# Patient Record
Sex: FEMALE | Race: AMERICAN INDIAN OR ALASKA NATIVE
[De-identification: names, ages, dates, MRNs, and addresses within clinical notes are randomized per-mention and may not be internally consistent; named-entity substitution may affect disease eponyms.]

---

## 2019-06-25 ENCOUNTER — HOSPITAL ENCOUNTER (EMERGENCY)
Dept: HOSPITAL 5 - ED | Age: 39
Discharge: LEFT BEFORE DISCHARGE | End: 2019-06-25
Payer: COMMERCIAL

## 2019-06-25 VITALS — SYSTOLIC BLOOD PRESSURE: 190 MMHG | DIASTOLIC BLOOD PRESSURE: 111 MMHG

## 2019-06-25 DIAGNOSIS — I10: ICD-10-CM

## 2019-06-25 DIAGNOSIS — E11.9: ICD-10-CM

## 2019-06-25 DIAGNOSIS — F17.200: ICD-10-CM

## 2019-06-25 DIAGNOSIS — I16.0: Primary | ICD-10-CM

## 2019-06-25 LAB
BASOPHILS # (AUTO): 0.2 K/MM3 (ref 0–0.1)
BASOPHILS NFR BLD AUTO: 1.2 % (ref 0–1.8)
BUN SERPL-MCNC: 7 MG/DL (ref 7–17)
BUN/CREAT SERPL: 14 %
CALCIUM SERPL-MCNC: 11.2 MG/DL (ref 8.4–10.2)
EOSINOPHIL # BLD AUTO: 0.5 K/MM3 (ref 0–0.4)
EOSINOPHIL NFR BLD AUTO: 4.3 % (ref 0–4.3)
HCT VFR BLD CALC: 40 % (ref 30.3–42.9)
HEMOLYSIS INDEX: 25
HGB BLD-MCNC: 12.6 GM/DL (ref 10.1–14.3)
LYMPHOCYTES # BLD AUTO: 4.1 K/MM3 (ref 1.2–5.4)
LYMPHOCYTES NFR BLD AUTO: 33.4 % (ref 13.4–35)
MCHC RBC AUTO-ENTMCNC: 32 % (ref 30–34)
MCV RBC AUTO: 74 FL (ref 79–97)
MONOCYTES # (AUTO): 0.6 K/MM3 (ref 0–0.8)
MONOCYTES % (AUTO): 5.2 % (ref 0–7.3)
PLATELET # BLD: 168 K/MM3 (ref 140–440)
RBC # BLD AUTO: 5.43 M/MM3 (ref 3.65–5.03)

## 2019-06-25 PROCEDURE — 36415 COLL VENOUS BLD VENIPUNCTURE: CPT

## 2019-06-25 PROCEDURE — 84484 ASSAY OF TROPONIN QUANT: CPT

## 2019-06-25 PROCEDURE — 85025 COMPLETE CBC W/AUTO DIFF WBC: CPT

## 2019-06-25 PROCEDURE — 80048 BASIC METABOLIC PNL TOTAL CA: CPT

## 2019-06-25 PROCEDURE — 71045 X-RAY EXAM CHEST 1 VIEW: CPT

## 2019-06-25 PROCEDURE — 93010 ELECTROCARDIOGRAM REPORT: CPT

## 2019-06-25 PROCEDURE — 93005 ELECTROCARDIOGRAM TRACING: CPT

## 2019-06-25 NOTE — EMERGENCY DEPARTMENT REPORT
ED Chest Pain HPI





- General


Chief Complaint: Chest Pain


Stated Complaint: CHEST PAIN


Time Seen by Provider: 06/25/19 06:11


Source: patient


Mode of arrival: Ambulatory


Limitations: No Limitations





- History of Present Illness


Initial Comments: 





39-year-old female presents to ED with complaint of chest pain 2 days.  Patient

characterizes pain as burning and tightness in the center of her chest.  Pain is

nonradiating.  Patient reports some nausea associated, no vomiting.  Denies 

shortness of breath, diaphoresis.  Patient states she initially thought the pain

was due to acid reflux.  States she took some antacids without relief.  The pain

continued into the next day.  Reports pain is intermittent, last approximately 5

minutes at a time.  Denies leg pain or swelling.  No aggravating or alleviating 

factors.  Patient is severely hypertensive.  States has been out of her blood 

pressure medications 2 weeks (clonidine, lisinopril, HCTZ).  Currently has no 

PCP.  Patient states she was scheduled to have a stress test previously, however

states she lost her insurance, so it was never done.  Patient reports tobacco 

use. Denies chest pain at this time


MD Complaint: chest pain


-: days(s) (2)


Onset: during rest, after eating


Pain Location: substernal


Severity: moderate


Severity scale (0 -10): 5


Quality: tightness, other (burning)


Consistency: intermittent


Improves With: nothing


Worsens With: nothing


re: nausea.  denies: vomting, diaphoresis, dyspnea


Other Symptoms: denies: cough, fever, leg swelling


Treatments Prior to Arrival: other (antacid)





- Related Data


                                Home Medications











 Medication  Instructions  Recorded  Confirmed  Last Taken


 


Clonidine HCl mg PO DAILY PRN 03/13/16  Unknown


 


Lisinopril/Hydrochlorothiazide 1 tab PO BID 03/13/16 03/13/16 Unknown








                                  Previous Rx's











 Medication  Instructions  Recorded  Last Taken  Type


 


Lisinopril/Hydrochlorothiazide 1 tab PO QDAY #30 tab 06/25/19 Unknown Rx





[Zestoretic 20-12.5 mg]    


 


cloNIDine [Catapres] 0.2 mg PO QHS #30 tablet 06/25/19 Unknown Rx











                                    Allergies











Allergy/AdvReac Type Severity Reaction Status Date / Time


 


No Known Allergies Allergy   Verified 06/25/19 02:52














Heart Score





- HEART Score


History: Slightly suspicious


EKG: Non-specific


Age: < 45


Risk factors: > 3 risk factors or hx of atherosclerotic disease


Troponin: < normal limit


HEART Score: 3





ED Review of Systems


ROS: 


Stated complaint: CHEST PAIN


Other details as noted in HPI





Comment: All other systems reviewed and negative


Constitutional: denies: chills, fever


Respiratory: denies: shortness of breath


Cardiovascular: chest pain


Gastrointestinal: nausea.  denies: vomiting


Musculoskeletal: other (denies leg pain and swelling)





ED Past Medical Hx





- Past Medical History


Previous Medical History?: Yes


Hx Hypertension: Yes


Hx Heart Attack/AMI:  (Angina)


Hx Diabetes: Yes





- Surgical History


Past Surgical History?: No





- Social History


Smoking Status: Current Every Day Smoker


Substance Use Type: None





- Medications


Home Medications: 


                                Home Medications











 Medication  Instructions  Recorded  Confirmed  Last Taken  Type


 


Clonidine HCl mg PO DAILY PRN 03/13/16  Unknown History


 


Lisinopril/Hydrochlorothiazide 1 tab PO BID 03/13/16 03/13/16 Unknown History


 


Lisinopril/Hydrochlorothiazide 1 tab PO QDAY #30 tab 06/25/19  Unknown Rx





[Zestoretic 20-12.5 mg]     


 


cloNIDine [Catapres] 0.2 mg PO QHS #30 tablet 06/25/19  Unknown Rx














ED Physical Exam





- General


Limitations: No Limitations


General appearance: alert, in no apparent distress, obese





- Head


Head exam: Present: atraumatic, normocephalic





- Eye


Eye exam: Present: normal appearance





- ENT


ENT exam: Present: mucous membranes moist





- Neck


Neck exam: Present: normal inspection





- Respiratory


Respiratory exam: Present: normal lung sounds bilaterally.  Absent: respiratory 

distress





- Cardiovascular


Cardiovascular Exam: Present: regular rate, normal rhythm





- GI/Abdominal


GI/Abdominal exam: Present: soft.  Absent: distended





- Extremities Exam


Extremities exam: Absent: pedal edema, calf tenderness





- Neurological Exam


Neurological exam: Present: alert, oriented X3





- Psychiatric


Psychiatric exam: Present: normal affect, normal mood





- Skin


Skin exam: Present: warm, dry, intact, normal color.  Absent: rash





ED Course


                                   Vital Signs











  06/25/19 06/25/19 06/25/19





  02:52 04:34 04:45


 


Temperature 98.5 F  


 


Pulse Rate 89  77


 


Respiratory 18  16





Rate   


 


Blood Pressure 205/118 167/86 168/91


 


O2 Sat by Pulse 99  96





Oximetry   














  06/25/19 06/25/19 06/25/19





  05:00 05:04 05:15


 


Temperature   


 


Pulse Rate 78  78


 


Respiratory 15 18 14





Rate   


 


Blood Pressure 170/89  177/84


 


O2 Sat by Pulse 97  99





Oximetry   














  06/25/19 06/25/19 06/25/19





  05:30 05:45 06:00


 


Temperature   


 


Pulse Rate 83 80 84


 


Respiratory 17 16 15





Rate   


 


Blood Pressure 181/100 175/93 196/97


 


O2 Sat by Pulse 97 96 98





Oximetry   














  06/25/19 06/25/19





  06:15 06:30


 


Temperature  


 


Pulse Rate 94 H 85


 


Respiratory 17 16





Rate  


 


Blood Pressure 189/123 190/111


 


O2 Sat by Pulse  98





Oximetry  














ED Medical Decision Making





- Lab Data


Result diagrams: 


                                 06/25/19 03:18





                                 06/25/19 03:18





- EKG Data


-: EKG Interpreted by Me


EKG shows normal: sinus rhythm, axis, intervals, QRS complexes, ST-T waves


Rate: normal





- EKG Data


Interpretation: no acute changes





- Radiology Data


Radiology results: report reviewed, image reviewed





- Medical Decision Making





38 yo F with hypertension, out of meds x 2 weeks and currently hypertensive, 

presents with chest pain.  EKG normal, trop negative x 2.  Advised that pt be 

admitted due to risk factors which include HTN, tobacco use, obesity.  Pt 

refuses admission.  States does not have anyone to take care of her children.  I

 to patient that if she were to be admitted and she wouldn't undergo stress 

testing possible cardiac cath and stent placement if needed.  Without admission,

 unable to determine presence of cardiac disease.  Informed patient on risk of 

possible heart attack, cardiac arrest, death.  Patient states she understands 

and will return to the ER when she is able to get her children taken care of.  

AMA form signed, patient given refill on prescriptions for her blood pressure 

medicines





- Differential Diagnosis


ACS, GERD, pneumonia, HTN emergency


Critical care attestation.: 


If time is entered above; I have spent that time in minutes in the direct care 

of this critically ill patient, excluding procedure time.








ED Disposition


Clinical Impression: 


 Hypertensive urgency, Chest pain





Disposition: DC-07 LEFT AGAINST MED ADVICE


Is pt being admited?: No


Condition: Stable


Instructions:  Chest Pain (ED), Hypertension (ED)


Prescriptions: 


cloNIDine [Catapres] 0.2 mg PO QHS #30 tablet


Lisinopril/Hydrochlorothiazide [Zestoretic 20-12.5 mg] 1 tab PO QDAY #30 tab


Referrals: 


[facility] [Primary Care Provider] - 3-5 Days


CUAUHTEMOC ARNOLD MD [Staff Physician] - 3-5 Days


Time of Disposition: 06:35

## 2019-06-25 NOTE — XRAY REPORT
PROCEDURE: XR CHEST 1V AP 

 

TECHNIQUE:  Chest radiograph single view.  

 

HISTORY: Chest Pain 

 

COMPARISONS: None . 

 

FINDINGS: 

 

Heart: Normal. 

Mediastinum/Vessels: Normal. 

Lungs/Pleural space:  Normal. 

Bony thorax: No acute osseous abnormality. 

Life support devices: None. 

 

IMPRESSION:  No acute cardiopulmonary abnormality. 

 

This document is electronically signed by Red Monsivais MD., June 25 2019 03:41:35 AM ET

## 2019-11-11 ENCOUNTER — HOSPITAL ENCOUNTER (EMERGENCY)
Dept: HOSPITAL 5 - ED | Age: 39
Discharge: LEFT BEFORE DISCHARGE | End: 2019-11-11
Payer: SELF-PAY

## 2019-11-11 VITALS — DIASTOLIC BLOOD PRESSURE: 92 MMHG | SYSTOLIC BLOOD PRESSURE: 137 MMHG

## 2019-11-11 DIAGNOSIS — Z53.21: ICD-10-CM

## 2019-11-11 DIAGNOSIS — R07.89: Primary | ICD-10-CM

## 2019-11-11 PROCEDURE — 93005 ELECTROCARDIOGRAM TRACING: CPT

## 2019-11-11 PROCEDURE — 93010 ELECTROCARDIOGRAM REPORT: CPT

## 2019-11-11 NOTE — EMERGENCY DEPARTMENT REPORT
Blank Doc





- Documentation


Documentation: 





39-year-old female that presents with chest pain and radiation of left arm.





This initial assessment/diagnostic orders/clinical plan/treatment(s) is/are 

subject to change based on patient's health status, clinical progression and re-

assessment by fellow clinical providers in the ED.  Further treatment and workup

at subsequent clinical providers discretion.  Patient/guardians urged not to 

elope from the ED as their condition may be serious if not clinically assessed 

and managed.  Initial orders include:


1- Patient sent to [facility] for further evaluation and treatment


2- labs


3- CXR


4- EKG

## 2020-11-19 ENCOUNTER — HOSPITAL ENCOUNTER (OUTPATIENT)
Dept: HOSPITAL 5 - ED | Age: 40
Setting detail: OBSERVATION
LOS: 1 days | Discharge: HOME | End: 2020-11-20
Attending: INTERNAL MEDICINE | Admitting: INTERNAL MEDICINE
Payer: MEDICAID

## 2020-11-19 ENCOUNTER — OFFICE VISIT (OUTPATIENT)
Dept: URBAN - METROPOLITAN AREA CLINIC 94 | Facility: CLINIC | Age: 40
End: 2020-11-19
Payer: COMMERCIAL

## 2020-11-19 ENCOUNTER — WEB ENCOUNTER (OUTPATIENT)
Dept: URBAN - METROPOLITAN AREA CLINIC 94 | Facility: CLINIC | Age: 40
End: 2020-11-19

## 2020-11-19 VITALS
HEART RATE: 78 BPM | HEIGHT: 62 IN | DIASTOLIC BLOOD PRESSURE: 88 MMHG | SYSTOLIC BLOOD PRESSURE: 132 MMHG | WEIGHT: 193 LBS | BODY MASS INDEX: 35.51 KG/M2 | TEMPERATURE: 97 F

## 2020-11-19 DIAGNOSIS — R10.9 ABDOMINAL PAIN: ICD-10-CM

## 2020-11-19 DIAGNOSIS — Z79.4: ICD-10-CM

## 2020-11-19 DIAGNOSIS — I20.9: ICD-10-CM

## 2020-11-19 DIAGNOSIS — Z79.82: ICD-10-CM

## 2020-11-19 DIAGNOSIS — D72.829: ICD-10-CM

## 2020-11-19 DIAGNOSIS — E11.9: ICD-10-CM

## 2020-11-19 DIAGNOSIS — F41.9: ICD-10-CM

## 2020-11-19 DIAGNOSIS — R07.89: Primary | ICD-10-CM

## 2020-11-19 DIAGNOSIS — K62.5 RECTAL BLEEDING: ICD-10-CM

## 2020-11-19 DIAGNOSIS — D35.02: ICD-10-CM

## 2020-11-19 DIAGNOSIS — Z98.890: ICD-10-CM

## 2020-11-19 DIAGNOSIS — I10: ICD-10-CM

## 2020-11-19 DIAGNOSIS — F17.210: ICD-10-CM

## 2020-11-19 LAB
ALBUMIN SERPL-MCNC: 4.3 G/DL (ref 3.9–5)
ALT SERPL-CCNC: 48 UNITS/L (ref 7–56)
BASOPHILS # (AUTO): 0.1 K/MM3 (ref 0–0.1)
BASOPHILS NFR BLD AUTO: 0.5 % (ref 0–1.8)
BUN SERPL-MCNC: 8 MG/DL (ref 7–17)
BUN/CREAT SERPL: 13 %
CALCIUM SERPL-MCNC: 10.1 MG/DL (ref 8.4–10.2)
EOSINOPHIL # BLD AUTO: 0.4 K/MM3 (ref 0–0.4)
EOSINOPHIL NFR BLD AUTO: 3.5 % (ref 0–4.3)
HCT VFR BLD CALC: 38 % (ref 30.3–42.9)
HEMOLYSIS INDEX: 9
HGB BLD-MCNC: 11.9 GM/DL (ref 10.1–14.3)
LYMPHOCYTES # BLD AUTO: 3.5 K/MM3 (ref 1.2–5.4)
LYMPHOCYTES NFR BLD AUTO: 28 % (ref 13.4–35)
MCHC RBC AUTO-ENTMCNC: 31 % (ref 30–34)
MCV RBC AUTO: 74 FL (ref 79–97)
MONOCYTES # (AUTO): 0.6 K/MM3 (ref 0–0.8)
MONOCYTES % (AUTO): 4.6 % (ref 0–7.3)
PLATELET # BLD: 228 K/MM3 (ref 140–440)
RBC # BLD AUTO: 5.16 M/MM3 (ref 3.65–5.03)

## 2020-11-19 PROCEDURE — 99204 OFFICE O/P NEW MOD 45 MIN: CPT | Performed by: INTERNAL MEDICINE

## 2020-11-19 PROCEDURE — 96374 THER/PROPH/DIAG INJ IV PUSH: CPT

## 2020-11-19 PROCEDURE — G8417 CALC BMI ABV UP PARAM F/U: HCPCS | Performed by: INTERNAL MEDICINE

## 2020-11-19 PROCEDURE — 93005 ELECTROCARDIOGRAM TRACING: CPT

## 2020-11-19 PROCEDURE — 80053 COMPREHEN METABOLIC PANEL: CPT

## 2020-11-19 PROCEDURE — 78452 HT MUSCLE IMAGE SPECT MULT: CPT

## 2020-11-19 PROCEDURE — G0378 HOSPITAL OBSERVATION PER HR: HCPCS

## 2020-11-19 PROCEDURE — 85025 COMPLETE CBC W/AUTO DIFF WBC: CPT

## 2020-11-19 PROCEDURE — 96375 TX/PRO/DX INJ NEW DRUG ADDON: CPT

## 2020-11-19 PROCEDURE — 84484 ASSAY OF TROPONIN QUANT: CPT

## 2020-11-19 PROCEDURE — G8427 DOCREV CUR MEDS BY ELIG CLIN: HCPCS | Performed by: INTERNAL MEDICINE

## 2020-11-19 PROCEDURE — 93017 CV STRESS TEST TRACING ONLY: CPT

## 2020-11-19 PROCEDURE — 93306 TTE W/DOPPLER COMPLETE: CPT

## 2020-11-19 PROCEDURE — 99285 EMERGENCY DEPT VISIT HI MDM: CPT

## 2020-11-19 PROCEDURE — A9502 TC99M TETROFOSMIN: HCPCS

## 2020-11-19 PROCEDURE — 71046 X-RAY EXAM CHEST 2 VIEWS: CPT

## 2020-11-19 PROCEDURE — 80048 BASIC METABOLIC PNL TOTAL CA: CPT

## 2020-11-19 PROCEDURE — 1036F TOBACCO NON-USER: CPT | Performed by: INTERNAL MEDICINE

## 2020-11-19 PROCEDURE — 36415 COLL VENOUS BLD VENIPUNCTURE: CPT

## 2020-11-19 RX ORDER — OMEPRAZOLE 40 MG/1
1 CAPSULE 30 MINUTES BEFORE MORNING MEAL CAPSULE, DELAYED RELEASE ORAL ONCE A DAY
Status: ACTIVE | COMMUNITY

## 2020-11-19 RX ORDER — DICYCLOMINE HYDROCHLORIDE 10 MG/1
1 TABLET CAPSULE ORAL THREE TIMES A DAY
Qty: 90 TABLET | Refills: 3 | OUTPATIENT
Start: 2020-11-19 | End: 2021-03-19

## 2020-11-19 RX ORDER — LISINOPRIL AND HYDROCHLOROTHIAZIDE 20; 25 MG/1; MG/1
1 TABLET TABLET ORAL ONCE A DAY
Status: ACTIVE | COMMUNITY

## 2020-11-19 RX ORDER — POLYETHYLENE GLYCOL 3350, SODIUM CHLORIDE, SODIUM BICARBONATE AND POTASSIUM CHLORIDE 420G
AS DIRECTED KIT ORAL ONCE
Qty: 420 GRAM | Refills: 0 | OUTPATIENT
Start: 2020-11-19 | End: 2020-11-20

## 2020-11-19 NOTE — EVENT NOTE
ED Screening Note


Date of service: 11/19/20


Time: 22:00


ED Screening Note: 


39 y/o female comes in for chest burning and left arm and leg burning since 

yesterday.





This initial assessment/diagnostic orders/clinical plan/treatment(s) is/are 

subject to change based on patients health status, clinical progression and re-

assessment by fellow clinical providers in the ED. Further treatment and workup 

at subsequent clinical providers discretion. Patient/guardian urged not to elope

from the ED as their condition may be serious if not clinically assessed and 

managed. 





Initial orders include:

## 2020-11-19 NOTE — HPI-TODAY'S VISIT:
Pt presents for evaluation of abdominal pain. Pain described as a dull discomfort localized to lower abdomen. Pain is intermittent . Pt has contipation with average 1 BM every 3-4 days. Pt had a single episode of rectal bleeding this morning. States that this is first time she saw any bleeding. Blood was small amount, mixed with mucous. Pt previously was told that she had " colitis" based on a CT scan and was treated with antibiotics. Pt has history of ventral hernia for which she sees Dr Tamayo from surgery and bilateral adrenal adenomas for which she sees Dr Ward. Pt has lost ~ 40 lbs over last 3-4 months. Pt never had a colonoscopy

## 2020-11-19 NOTE — XRAY REPORT
CHEST 2 VIEWS 



INDICATION / CLINICAL INFORMATION:

Chest Pain.



COMPARISON: 

6/25/2019



FINDINGS:



SUPPORT DEVICES: None.



HEART / MEDIASTINUM: No significant abnormality. 



LUNGS / PLEURA: No significant pulmonary or pleural abnormality. No pneumothorax. 



ADDITIONAL FINDINGS: No significant additional findings.



IMPRESSION:

1. No acute findings.



Signer Name: Kevin Henry MD 

Signed: 11/19/2020 10:33 PM

Workstation Name: [id number]

## 2020-11-20 VITALS — DIASTOLIC BLOOD PRESSURE: 97 MMHG | SYSTOLIC BLOOD PRESSURE: 156 MMHG

## 2020-11-20 LAB
BASOPHILS # (AUTO): 0.1 K/MM3 (ref 0–0.1)
BASOPHILS NFR BLD AUTO: 0.6 % (ref 0–1.8)
BUN SERPL-MCNC: 7 MG/DL (ref 7–17)
BUN/CREAT SERPL: 14 %
CALCIUM SERPL-MCNC: 9.6 MG/DL (ref 8.4–10.2)
EOSINOPHIL # BLD AUTO: 0.5 K/MM3 (ref 0–0.4)
EOSINOPHIL NFR BLD AUTO: 3.7 % (ref 0–4.3)
HCT VFR BLD CALC: 34.9 % (ref 30.3–42.9)
HEMOLYSIS INDEX: 9
HGB BLD-MCNC: 11.1 GM/DL (ref 10.1–14.3)
LYMPHOCYTES # BLD AUTO: 4.2 K/MM3 (ref 1.2–5.4)
LYMPHOCYTES NFR BLD AUTO: 33 % (ref 13.4–35)
MCHC RBC AUTO-ENTMCNC: 32 % (ref 30–34)
MCV RBC AUTO: 74 FL (ref 79–97)
MONOCYTES # (AUTO): 0.7 K/MM3 (ref 0–0.8)
MONOCYTES % (AUTO): 5.2 % (ref 0–7.3)
PLATELET # BLD: 206 K/MM3 (ref 140–440)
RBC # BLD AUTO: 4.74 M/MM3 (ref 3.65–5.03)

## 2020-11-20 NOTE — HISTORY AND PHYSICAL REPORT
History of Present Illness


Date of examination: 11/20/20


Date of admission: 


11/20/20 00:08





Chief complaint: 





Chest Pain


History of present illness: 





40-year-old female with known history of hypertension and diabetes mellitus 

presented to the emergency room today complaining of chest pain.  Chest pain is 

said to be left-sided and was burning in nature radiating towards the left upper

extremity and back.  On a scale of 10 pain was about 7/10 in severity.  She has 

had some associated shortness of breath but denies any nausea vomiting, no 

headache or dizziness and no diaphoresis.


Patient denies any sick contacts and no recent travel.  She denies any contact 

with anyone with COVID-19.





Patient indicates that she is occasionally anxious as she was recently diagnosed

with a left-sided adrenal gland tumor.  She denies any form of anxiety today.





Work-up in the emergency room so far has been unremarkable.  Patient has not had

any cardiac catheterization in the past.





Patient admitted for to be worked up for chest pain.





Past History


Past Medical History: diabetes, hypertension


Past Surgical History: Other (Breast biopsy)


Social history: smoking (Current daily smoker)


Family history: no significant family history





Medications and Allergies


                                    Allergies











Allergy/AdvReac Type Severity Reaction Status Date / Time


 


No Known Allergies Allergy   Verified 06/25/19 02:52











                                Home Medications











 Medication  Instructions  Recorded  Confirmed  Last Taken  Type


 


Clonidine HCl mg PO DAILY PRN 03/13/16  Unknown History


 


Lisinopril/Hydrochlorothiazide 1 tab PO BID 03/13/16 03/13/16 Unknown History


 


Lisinopril/Hydrochlorothiazide 1 tab PO QDAY #30 tab 06/25/19  Unknown Rx





[Zestoretic 20-12.5 mg]     


 


cloNIDine [Catapres] 0.2 mg PO QHS #30 tablet 06/25/19  Unknown Rx











Active Meds: 


Active Medications





Acetaminophen (Tylenol)  650 mg PO Q4H PRN


   PRN Reason: Pain MILD(1-3)/Fever >100.5/HA


Aspirin (Ecotrin)  325 mg PO QDAY ALEX


Dextrose (D50w (25gm) Syringe)  50 ml IV Q30MIN PRN; Protocol


   PRN Reason: Hypoglycemia


Heparin Sodium (Porcine) (Heparin)  5,000 unit SUB-Q Q8HR ALEX


Insulin Human Lispro (Humalog)  0 unit SUB-Q ACHS ALEX; Protocol


Magnesium Hydroxide (Milk Of Magnesia)  30 ml PO Q4H PRN


   PRN Reason: Constipation


Morphine Sulfate (Morphine)  2 mg IV Q5MIN PRN


   PRN Reason: Chest Pain


Nitroglycerin (Nitrostat)  0.4 mg SL Q5M PRN


   PRN Reason: Chest Pain


Ondansetron HCl (Zofran)  4 mg IV Q8H PRN


   PRN Reason: Nausea And Vomiting


Sodium Chloride (Sodium Chloride Flush Syringe 10 Ml)  10 ml IV BID ALEX


Sodium Chloride (Sodium Chloride Flush Syringe 10 Ml)  10 ml IV PRN PRN


   PRN Reason: LINE FLUSH











Review of Systems


Constitutional: no fever, no chills


Ears, nose, mouth and throat: no nasal congestion, no sore throat


Cardiovascular: chest pain, no palpitations


Respiratory: no cough, no shortness of breath


Gastrointestinal: abdominal pain, no nausea, no vomiting, no diarrhea


Genitourinary Female: no pelvic pain, no flank pain, no dysuria, no hematuria


Musculoskeletal: no neck pain, no low back pain


Integumentary: no rash, no pruritis


Neurological: no headaches, no confusion


Psychiatric: anxiety, no depression





Exam





- Constitutional


Vitals: 


                                        











Temp Pulse Resp BP Pulse Ox


 


 98.7 F   82   18   148/87   96 


 


 11/19/20 21:59  11/19/20 21:59  11/19/20 21:59  11/19/20 21:59  11/19/20 21:59











General appearance: Present: no acute distress, well-nourished, obese





- EENT


Eyes: Present: PERRL, EOM intact.  Absent: scleral icterus


ENT: hearing intact, clear oral mucosa, dentition normal





- Neck


Neck: Present: supple, normal ROM





- Respiratory


Respiratory effort: normal


Respiratory: bilateral: CTA





- Cardiovascular


Rhythm: regular


Heart Sounds: Present: S1 & S2.  Absent: gallop, systolic murmur, diastolic 

murmur, rub





- Extremities


Extremities: no ischemia, pulses intact, pulses symmetrical, No edema, Full ROM


Peripheral Pulses: within normal limits





- Abdominal


General gastrointestinal: Present: soft, non-tender, distended, normal bowel so

unds.  Absent: mass





- Integumentary


Integumentary: Present: clear, warm, dry.  Absent: rash





- Musculoskeletal


Musculoskeletal: strength equal bilaterally





- Psychiatric


Psychiatric: appropriate mood/affect, intact judgment & insight, memory intact, 

cooperative





- Neurologic


Neurologic: CNII-XII intact, no focal deficits, moves all extremities





HEART Score





- HEART Score


History: Moderately suspicious


EKG: Non-specific


Age: < 45


Risk factors: > 3 risk factors or hx of atherosclerotic disease


Troponin: 


                                        











Troponin T  < 0.010 ng/mL (0.00-0.029)   11/19/20  22:40    











Troponin: < normal limit


HEART Score: 4





Results





- Labs


CBC & Chem 7: 


                                 11/20/20 03:36





                                 11/20/20 03:36


Labs: 


                              Abnormal lab results











  11/19/20 11/19/20 Range/Units





  22:40 22:40 


 


WBC  12.5 H   (4.5-11.0)  K/mm3


 


RBC  5.16 H   (3.65-5.03)  M/mm3


 


MCV  74 L   (79-97)  fl


 


MCH  23 L   (28-32)  pg


 


RDW  16.1 H   (13.2-15.2)  %


 


Seg Neutrophils #  8.0 H   (1.8-7.7)  K/mm3


 


Sodium   135 L  (137-145)  mmol/L


 


Chloride   95.9 L  ()  mmol/L


 


Glucose   228 H  ()  mg/dL


 


AST   41 H  (5-40)  units/L


 


Total Protein   8.3 H  (6.3-8.2)  g/dL














Assessment and Plan





- Patient Problems


(1) Chest pain


Current Visit: Yes   Status: Acute   


Plan to address problem: 


Patient admitted and placed on telemetry.  Will check serial cardiac enzymes.  

Patient placed on aspirin, sublingual nitroglycerin and IV morphine as needed 

for chest pain.


We will place consult to cardiology for evaluation.








(2) Leucocytosis


Current Visit: Yes   Status: Acute   


Plan to address problem: 


Etiology is unclear.  Probably reactive.


However we will check a urinalysis.  Will monitor CBC.








(3) Hypertension


Current Visit: Yes   Status: Acute   


Plan to address problem: 


We will resume routine home medications once reconciled and monitor vital signs 

closely.








(4) Diabetes mellitus


Current Visit: Yes   Status: Acute   


Plan to address problem: 


We will monitor Accu-Cheks closely.








(5) DVT prophylaxis


Current Visit: Yes   Status: Acute   


Plan to address problem: 


Patient placed on anticoagulation with subcutaneous heparin.








(6) Full code status


Current Visit: Yes   Status: Acute

## 2020-11-20 NOTE — EMERGENCY DEPARTMENT REPORT
HPI





- General


Chief Complaint: Chest Pain


Time Seen by Provider: 20 23:46





- HPI


HPI: 





Room 2








The patient is a 40-year-old female present with a chief complaint of chest 

pain.  The patient states since yesterday she has had a constant pain in the le

ft chest that is described as burning in nature.  Patient states she has 

radiation of the pain to the left arm and left leg.  Patient admits to shortness

of breath with the chest pain but denies nausea/vomiting or diaphoresis.  

Patient denies cough or fever.  Patient denies contact with known Covid positive

patients.  Patient currently gives her chest pain a score of 7/10.  Patient 

states she is never had a stress test or cardiac catheterization





ED Past Medical Hx





- Past Medical History


Previous Medical History?: Yes


Hx Hypertension: Yes


Hx Heart Attack/AMI: Yes (Angina)


Hx Diabetes: Yes





- Surgical History


Past Surgical History?: Yes


Additional Surgical History: Breast biopsy





- Family History


Family history: no significant





- Social History


Smoking Status: Current Every Day Smoker (1 pack/day)


Substance Use Type: None (Denies illicit drug use)





- Medications


Home Medications: 


                                Home Medications











 Medication  Instructions  Recorded  Confirmed  Last Taken  Type


 


Clonidine HCl mg PO DAILY PRN 16  Unknown History


 


Lisinopril/Hydrochlorothiazide 1 tab PO BID 16 Unknown History


 


Lisinopril/Hydrochlorothiazide 1 tab PO QDAY #30 tab 19  Unknown Rx





[Zestoretic 20-12.5 mg]     


 


cloNIDine [Catapres] 0.2 mg PO QHS #30 tablet 19  Unknown Rx














ED Review of Systems


ROS: 


Stated complaint: CHEST PAIN


Other details as noted in HPI





Constitutional: denies: diaphoresis, fever


Eyes: denies: eye pain


ENT: denies: throat pain


Respiratory: shortness of breath


Cardiovascular: chest pain


Endocrine: no symptoms reported


Gastrointestinal: denies: nausea, vomiting


Genitourinary: denies: dysuria


Musculoskeletal: denies: back pain


Neurological: denies: headache





Physical Exam





- Physical Exam


Vital Signs: 


                                   Vital Signs











  20





  21:59


 


Temperature 98.7 F


 


Pulse Rate 82


 


Respiratory 18





Rate 


 


Blood Pressure 148/87


 


O2 Sat by Pulse 96





Oximetry 











Physical Exam: 





GENERAL: The patient is well-developed well-nourished female sitting on 

stretcher not appearing to be in acute distress. []


HEENT: Normocephalic.  Atraumatic.  Extraocular motions are intact.  Patient has

 moist mucous membranes.


NECK: Supple.  Trachea midline


CHEST/LUNGS: Clear to auscultation.  There is no respiratory distress noted.


HEART/CARDIOVASCULAR: Regular.  There is no tachycardia.  There is no gallop rub

 or murmur.


ABDOMEN: Abdomen is soft, nontender.  Patient has normal bowel sounds.  There is

 no abdominal distention.


SKIN: There is no rash.  There is no edema.  There is no diaphoresis.


NEURO: The patient is awake, alert, and oriented.  The patient is cooperative.  

The patient has normal speech


MUSCULOSKELETAL:  There is no evidence of acute injury.





ED Course


                                   Vital Signs











  20





  21:59


 


Temperature 98.7 F


 


Pulse Rate 82


 


Respiratory 18





Rate 


 


Blood Pressure 148/87


 


O2 Sat by Pulse 96





Oximetry 














ED Medical Decision Making





- Lab Data


Result diagrams: 


                                 20 22:40





                                 20 22:40





                                Laboratory Tests











  20





  22:40 22:40


 


WBC  12.5 H 


 


RBC  5.16 H 


 


Hgb  11.9 


 


Hct  38.0 


 


MCV  74 L 


 


MCH  23 L 


 


MCHC  31 


 


RDW  16.1 H 


 


Plt Count  228 


 


Lymph % (Auto)  28.0 


 


Mono % (Auto)  4.6 


 


Eos % (Auto)  3.5 


 


Baso % (Auto)  0.5 


 


Lymph # (Auto)  3.5 


 


Mono # (Auto)  0.6 


 


Eos # (Auto)  0.4 


 


Baso # (Auto)  0.1 


 


Seg Neutrophils %  63.4 


 


Seg Neutrophils #  8.0 H 


 


Sodium   135 L


 


Potassium   3.6


 


Chloride   95.9 L


 


Carbon Dioxide   23


 


Anion Gap   20


 


BUN   8


 


Creatinine   0.6


 


Estimated GFR   > 60


 


BUN/Creatinine Ratio   13


 


Glucose   228 H


 


Calcium   10.1


 


Total Bilirubin   0.30


 


AST   41 H


 


ALT   48


 


Alkaline Phosphatase   129


 


Troponin T   < 0.010


 


Total Protein   8.3 H


 


Albumin   4.3


 


Albumin/Globulin Ratio   1.1














- EKG Data


-: EKG Interpreted by Me


EKG shows normal: sinus rhythm


Rate: normal





- EKG Data


When compared to previous EKG there are: previous EKG unavailable


Interpretation: other (No ischemic changes seen)





- Radiology Data


Radiology results: report reviewed (Chest x-ray), image reviewed (Chest x-ray)


interpreted by me: 





Chest x-ray-no focal infiltrates, no pneumothorax, no foreign body seen





[facility] [address] [place] [address] 

XRay Report Signed Patient: PRIMITIVO TINAJERO MR#:  [id number] : 1980 

Acct:[id number] Age/Sex: 40 / F ADM Date: 20 Loc: ED Attending Dr: 

Ordering Physician: ILENE MONTGOMERY Date of Service: 20 

Procedure(s): XR chest routine 2V Accession Number(s): [id number] cc: ILENE MONTGOMERY Fluoro Time In Minutes: CHEST 2 VIEWS INDICATION / CLINICAL 

INFORMATION: Chest Pain. COMPARISON: 2019 FINDINGS: SUPPORT DEVICES: None. 

HEART / MEDIASTINUM: No significant abnormality. LUNGS / PLEURA: No significant 

pulmonary or pleural abnormality. No pneumothorax. ADDITIONAL FINDINGS: No 

significant additional findings. IMPRESSION: 1. No acute findings. Signer Name: 

Kevin Henry MD Signed: 2020 10:33 PM Workstation Name: [id number] 

Transcribed By: NEO Dictated By: Kevin Henry MD Electronically Authenticated

 By: Kevin Henry MD Signed Date/Time: 20 DD/DT: 20 

TD/TT: 





- Differential Diagnosis


ACS, GERD, pericarditis, costochondritis


Critical care attestation.: 


If time is entered above; I have spent that time in minutes in the direct care 

of this critically ill patient, excluding procedure time.








ED Disposition


Clinical Impression: 


 Chest pain





Disposition:  OP ADMIT IP TO THIS HOSP


Is pt being admited?: Yes


Does the pt Need Aspirin: Yes


Condition: Fair


Instructions:  Chest Pain (ED)


Referrals: 


[facility] [Primary Care Provider] - 3-5 Days


Time of Disposition: 00:05 (Hospitalist paged (Dr Donald))





HEART Score





- HEART Score


History: Highly suspicious


EKG: Normal


Age: < 45


Risk factors: > 3 risk factors or hx of atherosclerotic disease


Troponin: 


                                        











Troponin T  < 0.010 ng/mL (0.00-0.029)   20  22:40    











Troponin: < normal limit


HEART Score: 4

## 2020-11-20 NOTE — DISCHARGE SUMMARY
Providers





- Providers


Date of Admission: 


11/20/20 00:08





Date of discharge: 11/20/20


Attending physician: 


SCOTTY OCONNOR





                                        





11/20/20


Consult to Cardiac Rehabilitation [CONS] Routine 


   Reason For Exam: Phase I





11/20/20 01:00


Consult to Cardiology [CONS] Routine 


   Consulting Provider: KATHI MONTILLA


   Reason For Exam: chest pain


Consult to Dietitian/Nutrition [CONS] Routine 


   Physician Instructions: 


   Reason For Exam: 


   Reason for Consult: Diet education











Primary care physician: 


[facility], [place]








Hospitalization


Condition: Fair


Hospital course: 





This is a 40-year-old female with known history of hypertension and diabetes 

mellitus presented to the emergency room today complaining of chest pain. Work-

up in the emergency room so far has been unremarkable. Patient admitted for to 

be worked up for chest pain.


Chest pain currently resolved. AMI r/o. S/p treadmill MPI stress test today 

which was negative for ischemia, good exercise tolerance. TTE reviewed - EF 60-

65%, mild LVH.  Cardiology recommended no further work-up and outpatient follow-

up.  Patient was then discharged home in stable condition with outpatient 

follow-up.





Discharge diagnosis:


(1) Chest pain


Current Visit: Yes   Status: Resolved  


Likely from GERD





(2) Hypertension


Current Visit: Yes   Status: Acute   





(3) Diabetes mellitus


Current Visit: Yes   Status: Acute   





(4) Leucocytosis


Current Visit: Yes   Status: Acute 


Likely reactive, chest x-ray showed no infiltrate, patient was afebrile





Disposition: DC-01 TO HOME OR SELFCARE


Time spent for discharge: 34 minutes





Core Measure Documentation





- Palliative Care


Palliative Care/ Comfort Measures: Not Applicable





- Core Measures


Any of the following diagnoses?: none





Exam





- Physical Exam


Narrative exam: 





GENERAL:  well-developed and well-nourished   lying on bed appeared to be in no 

discomfort. 


HEENT: Normocephalic.  Atraumatic.  No conjunctival congestion or icterus. 

Patient has moist mucous membranes.


NECK: Supple.  Trachea midline.


CHEST/LUNGS: Clear to auscultated bilaterally, breathing nonlabored. No wheezes 

crackles or rhonchi.


HEART/CARDIOVASCULAR: Regular in rate and rhythm.  S1 and S2 positive.


ABDOMEN: Abdomen is soft, nontender.  Patient has normal bowel sounds.  


SKIN: There is no rash.  Warm and dry.


NEURO:  No focal motor deficit.  Follows command.


MUSCULOSKELETAL: No joint effusion or tenderness.


EXTRIMITY: No edema, no cyanosis or clubbing.


PSYCH:  Cooperative.





- Constitutional


Vitals: 


                                        











Temp Pulse Resp BP Pulse Ox


 


 98.7 F   67   16   156/97   100 


 


 11/19/20 21:59  11/20/20 06:00  11/20/20 08:11  11/20/20 11:06  11/20/20 08:11














Plan


Activity: advance as tolerated


Weight Bearing Status: Weight Bear as Tolerated


Diet: low fat, low salt


Additional Instructions: Follow-up with cardiology in 1 week


Follow up with: 


[facility], [place] [Primary Care Provider] - 3-5 Days


Prescriptions: 


Omeprazole 20 mg PO DAILY #30

## 2020-11-20 NOTE — CONSULTATION
History of Present Illness


Consult date: 11/20/20


Requesting physician: FLORENCIA OLIVO


Consult reason: chest pain


History of present illness: 


The pt is a 40-year-old female with a past medical history of hypertension and 

diabetes mellitus. She is previously unknown to our practice. She presented with

c/o chest pain for 2 days prior to arrival. She describes her chest pain as an 

intermittent burning pain with some associated SOB. She denies any palpitations,

n/v diaphoresis, dizziness or syncope. No known prior cardiac issues. 








Past History


Past Medical History: diabetes, hypertension


Past Surgical History: Other (Breast biopsy)


Social history: smoking (Current daily smoker)


Family history: no significant family history





Medications and Allergies


                                    Allergies











Allergy/AdvReac Type Severity Reaction Status Date / Time


 


No Known Allergies Allergy   Verified 06/25/19 02:52











                                Home Medications











 Medication  Instructions  Recorded  Confirmed  Last Taken  Type


 


Lisinopril/Hydrochlorothiazide 1 tab PO QDAY #30 tab 06/25/19  Unknown Rx





[Zestoretic 20-12.5 mg]     


 


cloNIDine [Catapres] 0.2 mg PO QHS #30 tablet 06/25/19  Unknown Rx


 


Omeprazole 10 mg PO DAILY 11/20/20 11/20/20 Unknown History


 


buPROPion [Wellbutrin] 75 mg PO BID 11/20/20 11/20/20 Unknown History


 


metFORMIN [Glucophage] 500 mg PO QDAY 11/20/20 11/20/20 Unknown History











Active Meds: 


Active Medications





Acetaminophen (Tylenol)  650 mg PO Q4H PRN


   PRN Reason: Pain MILD(1-3)/Fever >100.5/HA


Aspirin (Ecotrin)  325 mg PO QDAY ALEX


Dextrose (D50w (25gm) Syringe)  50 ml IV Q30MIN PRN; Protocol


   PRN Reason: Hypoglycemia


Heparin Sodium (Porcine) (Heparin)  5,000 unit SUB-Q Q8HR ALEX


Insulin Human Lispro (Humalog)  0 unit SUB-Q ACHS ALEX; Protocol


Magnesium Hydroxide (Milk Of Magnesia)  30 ml PO Q4H PRN


   PRN Reason: Constipation


Morphine Sulfate (Morphine)  2 mg IV Q5MIN PRN


   PRN Reason: Chest Pain


Nitroglycerin (Nitrostat)  0.4 mg SL Q5M PRN


   PRN Reason: Chest Pain


Ondansetron HCl (Zofran)  4 mg IV Q8H PRN


   PRN Reason: Nausea And Vomiting


Sodium Chloride (Sodium Chloride Flush Syringe 10 Ml)  10 ml IV BID ALEX


Sodium Chloride (Sodium Chloride Flush Syringe 10 Ml)  10 ml IV PRN PRN


   PRN Reason: LINE FLUSH











Review of Systems


Constitutional: no weight loss, no weight gain, no fever, no chills, no sweats


Ears, nose, mouth and throat: no ear pain, no nose pain, no sinus pressure, no 

sinus pain


Cardiovascular: chest pain, shortness of breath, no orthopnea, no palpitations, 

no rapid/irregular heart beat, no edema, no syncope, no lightheadedness, no leg 

edema


Respiratory: shortness of breath, no cough, no congestion, no wheezing, no pain 

on inspiration


Gastrointestinal: no abdominal pain, no nausea, no vomiting, no diarrhea, no co

nstipation, no change in bowel habits


Genitourinary Female: no pelvic pain, no flank pain, no dysuria, no urinary 

frequency, no urgency


Musculoskeletal: no neck stiffness, no neck pain, no shooting arm pain, no arm 

numbness/tingling, no low back pain


Integumentary: no rash, no pruritis, no redness, no sores, no wounds


Neurological: no head injury, no paralysis, no weakness, no parathesias, no numb

ness, no tingling, no seizures, no syncope


Psychiatric: no anxiety


Endocrine: no cold intolerance, no heat intolerance


Hematologic/Lymphatic: no easy bruising, no easy bleeding


Allergic/Immunologic: no urticaria





Physical Examination


                                   Vital Signs











Temp Pulse Resp BP Pulse Ox


 


 98.7 F   82   18   148/87   96 


 


 11/19/20 21:59  11/19/20 21:59  11/19/20 21:59  11/19/20 21:59  11/19/20 21:59











General appearance: no acute distress


HEENT: Positive: PERRL, Normocephaly, Mucus Membranes Moist


Neck: Positive: neck supple, trachea midline


Cardiac: Positive: Reg Rate and Rhythm, S1/S2


Lungs: Positive: Decreased Breath Sounds


Neuro: Positive: Grossly Intact


Abdomen: Negative: Tender


Skin: Negative: Rash


Musculoskeletal: No Pain


Extremities: Absent: edema





Results





                                 11/20/20 03:36





                                 11/20/20 03:36


                                 Cardiac Enzymes











  11/19/20 Range/Units





  22:40 


 


AST  41 H  (5-40)  units/L








                                       CBC











  11/19/20 11/20/20 Range/Units





  22:40 03:36 


 


WBC  12.5 H  12.8 H  (4.5-11.0)  K/mm3


 


RBC  5.16 H  4.74  (3.65-5.03)  M/mm3


 


Hgb  11.9  11.1  (10.1-14.3)  gm/dl


 


Hct  38.0  34.9  (30.3-42.9)  %


 


Plt Count  228  206  (140-440)  K/mm3


 


Lymph # (Auto)  3.5  4.2  (1.2-5.4)  K/mm3


 


Mono # (Auto)  0.6  0.7  (0.0-0.8)  K/mm3


 


Eos # (Auto)  0.4  0.5 H  (0.0-0.4)  K/mm3


 


Baso # (Auto)  0.1  0.1  (0.0-0.1)  K/mm3








                          Comprehensive Metabolic Panel











  11/19/20 11/20/20 Range/Units





  22:40 03:36 


 


Sodium  135 L  135 L  (137-145)  mmol/L


 


Potassium  3.6  3.6  (3.6-5.0)  mmol/L


 


Chloride  95.9 L  97.7 L  ()  mmol/L


 


Carbon Dioxide  23  23  (22-30)  mmol/L


 


BUN  8  7  (7-17)  mg/dL


 


Creatinine  0.6  0.5 L  (0.6-1.2)  mg/dL


 


Glucose  228 H  199 H  ()  mg/dL


 


Calcium  10.1  9.6  (8.4-10.2)  mg/dL


 


AST  41 H   (5-40)  units/L


 


ALT  48   (7-56)  units/L


 


Alkaline Phosphatase  129   ()  units/L


 


Total Protein  8.3 H   (6.3-8.2)  g/dL


 


Albumin  4.3   (3.9-5)  g/dL














- Imaging and Cardiology


Echo: report reviewed


EKG: report reviewed, image reviewed





EKG interpretations





- Telemetry


EKG Rhythm: Sinus Rhythm





- EKG


Sinus rhythms and dysrhythmias: sinus rhythm





Assessment and Plan


Chest pain currently resolved. AMI r/o. S/p treadmill MPI stress test today 

which was negative for ischemia, good exercise tolerance. tte reviewed - EF 60-

65%, mild LVH. 


Currently stable cardiac status. Pt may discharge from cardiology standpoint. 

Recommend pt follow up in our office with Dr. Shafer within 2 weeks 

([phone]). 





The patient has been seen in conjunction with Dr. Shafer who agrees with the 

assessment and plan of care. 








- Patient Problems


(1) Chest pain


Current Visit: Yes   Status: Resolved   





(2) Hypertension


Current Visit: Yes   Status: Acute   





(3) Diabetes mellitus


Current Visit: Yes   Status: Acute   





(4) Leucocytosis


Current Visit: Yes   Status: Acute

## 2020-11-21 NOTE — TREADMILL REPORT
STRESS MYOCARDIAL PERFUSION IMAGING REPORT



INDICATION:  The patient is a 40-year-old female with history of chest pains of

2 days' duration with history of essential hypertension, was being evaluated for

chest pains.



FINDINGS:  Baseline EKG showed sinus rhythm, within normal limits.  The patient

exercised for 6 minutes on standard Jens protocol, attained a heart rate of 170

beats per minute, which is 94% of predicted maximal heart rate.  The patient did

not have any chest pain to suggest angina.  EKG did not show any significant

changes to suggest ischemia.  Myocardial perfusion imaging was performed at rest

and also post-stress using technetium 99m nuclear tracer.  These images showed

normal perfusion post-stress and during rest.  Gated studies showed normal left

ventricular systolic function with calculated ejection fraction of 65%

post-stress.  Normal volumes noted with end-diastolic volume of 93 and

end-systolic volume of 33.  Transient ischemic dilation ratio was found to be

1.07.



FINAL IMPRESSION:

1.  Fair exercise tolerance.

2.  Negative for angina, negative for ischemia on the EKG.  Perfusion images

using technetium 99m showed normal perfusion.  Normal left ventricular systolic

function noted.  Prognostically, this is a low-risk study for future

cardiovascular events.





DD: 11/20/2020 12:02

DT: 11/21/2020 05:37

JOB# [id number]  [id number]

DRK/STEFAN

## 2020-12-04 ENCOUNTER — OFFICE VISIT (OUTPATIENT)
Dept: URBAN - METROPOLITAN AREA CLINIC 94 | Facility: CLINIC | Age: 40
End: 2020-12-04

## 2020-12-31 ENCOUNTER — TELEPHONE ENCOUNTER (OUTPATIENT)
Dept: URBAN - METROPOLITAN AREA SURGERY CENTER 30 | Facility: SURGERY CENTER | Age: 40
End: 2020-12-31

## 2021-01-06 ENCOUNTER — OFFICE VISIT (OUTPATIENT)
Dept: URBAN - METROPOLITAN AREA SURGERY CENTER 17 | Facility: SURGERY CENTER | Age: 41
End: 2021-01-06

## 2021-07-29 ENCOUNTER — OFFICE VISIT (OUTPATIENT)
Dept: URBAN - METROPOLITAN AREA CLINIC 52 | Facility: CLINIC | Age: 41
End: 2021-07-29
Payer: COMMERCIAL

## 2021-07-29 VITALS
BODY MASS INDEX: 34.04 KG/M2 | HEIGHT: 62 IN | DIASTOLIC BLOOD PRESSURE: 100 MMHG | SYSTOLIC BLOOD PRESSURE: 174 MMHG | WEIGHT: 185 LBS | TEMPERATURE: 98.8 F | HEART RATE: 73 BPM

## 2021-07-29 DIAGNOSIS — K59.00 CONSTIPATION, UNSPECIFIED CONSTIPATION TYPE: ICD-10-CM

## 2021-07-29 DIAGNOSIS — R10.30 LOWER ABDOMINAL PAIN: ICD-10-CM

## 2021-07-29 PROCEDURE — 99214 OFFICE O/P EST MOD 30 MIN: CPT | Performed by: INTERNAL MEDICINE

## 2021-07-29 RX ORDER — GABAPENTIN 300 MG/1
1 CAPSULE CAPSULE ORAL ONCE A DAY
Status: ON HOLD | COMMUNITY

## 2021-07-29 RX ORDER — GLIPIZIDE 5 MG/1
1 TABLET 30 MINUTES BEFORE BREAKFAST TABLET ORAL ONCE A DAY
Status: ACTIVE | COMMUNITY

## 2021-07-29 RX ORDER — POLYETHYLENE GLYCOL 3350, SODIUM SULFATE, SODIUM CHLORIDE, POTASSIUM CHLORIDE, ASCORBIC ACID, SODIUM ASCORBATE 7.5-2.691G
AS DIRECTED KIT ORAL
Qty: 1 | Refills: 1 | OUTPATIENT
Start: 2021-07-29 | End: 2021-07-31

## 2021-07-29 RX ORDER — LINACLOTIDE 72 UG/1
1 CAPSULE AT LEAST 30 MINUTES BEFORE THE FIRST MEAL OF THE DAY ON AN EMPTY STOMACH CAPSULE, GELATIN COATED ORAL ONCE A DAY
Qty: 30 | Refills: 3 | OUTPATIENT
Start: 2021-07-29 | End: 2021-11-25

## 2021-07-29 RX ORDER — LISINOPRIL AND HYDROCHLOROTHIAZIDE 20; 25 MG/1; MG/1
1 TABLET TABLET ORAL ONCE A DAY
Status: ON HOLD | COMMUNITY

## 2021-07-29 RX ORDER — OMEPRAZOLE 40 MG/1
1 CAPSULE 30 MINUTES BEFORE MORNING MEAL CAPSULE, DELAYED RELEASE ORAL ONCE A DAY
Status: ON HOLD | COMMUNITY

## 2021-07-29 RX ORDER — AMLODIPINE BESYLATE 5 MG/1
1 TABLET TABLET ORAL ONCE A DAY
Status: ACTIVE | COMMUNITY

## 2021-07-29 RX ORDER — FAMOTIDINE 20 MG/1
1 TABLET AT BEDTIME AS NEEDED TABLET, FILM COATED ORAL ONCE A DAY
Status: ON HOLD | COMMUNITY

## 2021-07-29 NOTE — HPI-TODAY'S VISIT:
Pt presents for evaluation of abdominal pain. Pain described as a dull discomfort localized to lower abdomen. Pain is intermittent . Pt has contipation with average 1 BM every 3-4 days. Pt previously was told that she had " colitis" based on a CT scan and was treated with antibiotics. Pt has history of ventral hernia for which she sees Dr Tamayo from surgery and bilateral adrenal adenomas for which she sees Dr Ward. Pt has lost ~ 40 lbs over last 3-4 months. Pt never had a colonoscopy. Colonoscopy was recommended in 11/2020 but she did not get it done.  CT A/P 6/2021: Overall similar appearance of the abdomen with a dominant approximately 1.1 cm nodule in the right adrenal gland characteristic of an adrenal adenoma. Smaller nodule in the anterior right adrenal gland and minimal nodular thickening of the left adrenal gland is also similarand likely related to the same underlying process.  MRI abdomen 1/2021: Right adrenal nodule measuring just over 1 cm demonstrate signal dropout on out of phase imaging most indicative of adenoma there may be a second small 6 mm nodule which also drops out likely a small adenoma. No other suspicious abnormality noted within the abdomen. Small focus of enhancement and nodular wall thickening within the gallbladder stable from recent CT. Fatty change within the liver again noted

## 2021-08-02 PROBLEM — 14760008: Status: ACTIVE | Noted: 2021-07-29

## 2021-08-12 ENCOUNTER — TELEPHONE ENCOUNTER (OUTPATIENT)
Dept: URBAN - METROPOLITAN AREA CLINIC 94 | Facility: CLINIC | Age: 41
End: 2021-08-12

## 2021-08-12 RX ORDER — POLYETHYLENE GLYCOL-3350 AND ELECTROLYTES WITH FLAVOR PACK 240; 5.84; 2.98; 6.72; 22.72 G/278.26G; G/278.26G; G/278.26G; G/278.26G; G/278.26G
AS DIRECTED POWDER, FOR SOLUTION ORAL
Qty: 1 | Refills: 0 | OUTPATIENT
Start: 2021-08-12 | End: 2021-08-13

## 2021-08-26 ENCOUNTER — OFFICE VISIT (OUTPATIENT)
Dept: URBAN - METROPOLITAN AREA SURGERY CENTER 17 | Facility: SURGERY CENTER | Age: 41
End: 2021-08-26
Payer: COMMERCIAL

## 2021-08-26 DIAGNOSIS — K59.09 CHANGE IN BOWEL MOVEMENTS INTERMITTENT CONSTIPATION. URGENCY IN THE MORNING.: ICD-10-CM

## 2021-08-26 DIAGNOSIS — R10.32 ABDOMINAL CRAMPING IN LEFT LOWER QUADRANT: ICD-10-CM

## 2021-08-26 DIAGNOSIS — K63.5 BENIGN COLON POLYP: ICD-10-CM

## 2021-08-26 DIAGNOSIS — R10.31 ABDOMINAL CRAMPING IN RIGHT LOWER QUADRANT: ICD-10-CM

## 2021-08-26 PROCEDURE — 45380 COLONOSCOPY AND BIOPSY: CPT | Performed by: INTERNAL MEDICINE

## 2021-08-26 PROCEDURE — G8907 PT DOC NO EVENTS ON DISCHARG: HCPCS | Performed by: INTERNAL MEDICINE

## 2021-08-26 RX ORDER — LINACLOTIDE 72 UG/1
1 CAPSULE AT LEAST 30 MINUTES BEFORE THE FIRST MEAL OF THE DAY ON AN EMPTY STOMACH CAPSULE, GELATIN COATED ORAL ONCE A DAY
Qty: 30 | Refills: 3 | Status: ACTIVE | COMMUNITY
Start: 2021-07-29 | End: 2021-11-25

## 2021-08-26 RX ORDER — GABAPENTIN 300 MG/1
1 CAPSULE CAPSULE ORAL ONCE A DAY
Status: ON HOLD | COMMUNITY

## 2021-08-26 RX ORDER — FAMOTIDINE 20 MG/1
1 TABLET AT BEDTIME AS NEEDED TABLET, FILM COATED ORAL ONCE A DAY
Status: ON HOLD | COMMUNITY

## 2021-08-26 RX ORDER — LINACLOTIDE 72 UG/1
1 CAPSULE AT LEAST 30 MINUTES BEFORE THE FIRST MEAL OF THE DAY ON AN EMPTY STOMACH CAPSULE, GELATIN COATED ORAL ONCE A DAY
Qty: 30 | Refills: 3
Start: 2021-07-29 | End: 2021-12-24

## 2021-08-26 RX ORDER — LISINOPRIL AND HYDROCHLOROTHIAZIDE 20; 25 MG/1; MG/1
1 TABLET TABLET ORAL ONCE A DAY
Status: ON HOLD | COMMUNITY

## 2021-08-26 RX ORDER — GLIPIZIDE 5 MG/1
1 TABLET 30 MINUTES BEFORE BREAKFAST TABLET ORAL ONCE A DAY
Status: ACTIVE | COMMUNITY

## 2021-08-26 RX ORDER — AMLODIPINE BESYLATE 5 MG/1
1 TABLET TABLET ORAL ONCE A DAY
Status: ACTIVE | COMMUNITY

## 2021-08-26 RX ORDER — OMEPRAZOLE 40 MG/1
1 CAPSULE 30 MINUTES BEFORE MORNING MEAL CAPSULE, DELAYED RELEASE ORAL ONCE A DAY
Status: ON HOLD | COMMUNITY

## 2021-09-01 ENCOUNTER — TELEPHONE ENCOUNTER (OUTPATIENT)
Dept: URBAN - METROPOLITAN AREA CLINIC 23 | Facility: CLINIC | Age: 41
End: 2021-09-01

## 2021-09-03 ENCOUNTER — OFFICE VISIT (OUTPATIENT)
Dept: URBAN - METROPOLITAN AREA SURGERY CENTER 17 | Facility: SURGERY CENTER | Age: 41
End: 2021-09-03

## 2021-09-30 ENCOUNTER — OFFICE VISIT (OUTPATIENT)
Dept: URBAN - METROPOLITAN AREA CLINIC 52 | Facility: CLINIC | Age: 41
End: 2021-09-30

## 2021-09-30 RX ORDER — GABAPENTIN 300 MG/1
1 CAPSULE CAPSULE ORAL ONCE A DAY
Status: ON HOLD | COMMUNITY

## 2021-09-30 RX ORDER — LINACLOTIDE 72 UG/1
1 CAPSULE AT LEAST 30 MINUTES BEFORE THE FIRST MEAL OF THE DAY ON AN EMPTY STOMACH CAPSULE, GELATIN COATED ORAL ONCE A DAY
Qty: 30 | Refills: 3 | Status: ACTIVE | COMMUNITY
Start: 2021-07-29 | End: 2021-12-24

## 2021-09-30 RX ORDER — LISINOPRIL AND HYDROCHLOROTHIAZIDE 20; 25 MG/1; MG/1
1 TABLET TABLET ORAL ONCE A DAY
Status: ON HOLD | COMMUNITY

## 2021-09-30 RX ORDER — GLIPIZIDE 5 MG/1
1 TABLET 30 MINUTES BEFORE BREAKFAST TABLET ORAL ONCE A DAY
Status: ACTIVE | COMMUNITY

## 2021-09-30 RX ORDER — OMEPRAZOLE 40 MG/1
1 CAPSULE 30 MINUTES BEFORE MORNING MEAL CAPSULE, DELAYED RELEASE ORAL ONCE A DAY
Status: ON HOLD | COMMUNITY

## 2021-09-30 RX ORDER — AMLODIPINE BESYLATE 5 MG/1
1 TABLET TABLET ORAL ONCE A DAY
Status: ACTIVE | COMMUNITY

## 2021-09-30 RX ORDER — FAMOTIDINE 20 MG/1
1 TABLET AT BEDTIME AS NEEDED TABLET, FILM COATED ORAL ONCE A DAY
Status: ON HOLD | COMMUNITY

## 2021-10-14 ENCOUNTER — DASHBOARD ENCOUNTERS (OUTPATIENT)
Age: 41
End: 2021-10-14

## 2021-10-14 ENCOUNTER — OFFICE VISIT (OUTPATIENT)
Dept: URBAN - METROPOLITAN AREA CLINIC 52 | Facility: CLINIC | Age: 41
End: 2021-10-14
Payer: COMMERCIAL

## 2021-10-14 ENCOUNTER — LAB OUTSIDE AN ENCOUNTER (OUTPATIENT)
Dept: URBAN - METROPOLITAN AREA CLINIC 52 | Facility: CLINIC | Age: 41
End: 2021-10-14

## 2021-10-14 VITALS
BODY MASS INDEX: 35.11 KG/M2 | WEIGHT: 190.8 LBS | HEART RATE: 92 BPM | DIASTOLIC BLOOD PRESSURE: 90 MMHG | TEMPERATURE: 97.3 F | HEIGHT: 62 IN | SYSTOLIC BLOOD PRESSURE: 139 MMHG

## 2021-10-14 DIAGNOSIS — K59.01 SLOW TRANSIT CONSTIPATION: ICD-10-CM

## 2021-10-14 DIAGNOSIS — R14.0 BLOATING: ICD-10-CM

## 2021-10-14 PROBLEM — 35298007: Status: ACTIVE | Noted: 2021-10-14

## 2021-10-14 PROCEDURE — 99213 OFFICE O/P EST LOW 20 MIN: CPT | Performed by: INTERNAL MEDICINE

## 2021-10-14 RX ORDER — FAMOTIDINE 20 MG/1
1 TABLET AT BEDTIME AS NEEDED TABLET, FILM COATED ORAL ONCE A DAY
Status: ON HOLD | COMMUNITY

## 2021-10-14 RX ORDER — LISINOPRIL AND HYDROCHLOROTHIAZIDE 20; 25 MG/1; MG/1
1 TABLET TABLET ORAL ONCE A DAY
Status: ON HOLD | COMMUNITY

## 2021-10-14 RX ORDER — GLIPIZIDE 5 MG/1
1 TABLET 30 MINUTES BEFORE BREAKFAST TABLET ORAL ONCE A DAY
Status: ACTIVE | COMMUNITY

## 2021-10-14 RX ORDER — OMEPRAZOLE 40 MG/1
1 CAPSULE 30 MINUTES BEFORE MORNING MEAL CAPSULE, DELAYED RELEASE ORAL ONCE A DAY
Status: ON HOLD | COMMUNITY

## 2021-10-14 RX ORDER — LINACLOTIDE 72 UG/1
1 CAPSULE AT LEAST 30 MINUTES BEFORE THE FIRST MEAL OF THE DAY ON AN EMPTY STOMACH CAPSULE, GELATIN COATED ORAL ONCE A DAY
Qty: 30 | Refills: 3 | Status: ACTIVE | COMMUNITY
Start: 2021-07-29 | End: 2021-12-24

## 2021-10-14 RX ORDER — AMLODIPINE BESYLATE 5 MG/1
1 TABLET TABLET ORAL ONCE A DAY
Status: ACTIVE | COMMUNITY

## 2021-10-14 RX ORDER — GABAPENTIN 300 MG/1
1 CAPSULE CAPSULE ORAL ONCE A DAY
Status: ON HOLD | COMMUNITY

## 2021-10-14 NOTE — HPI-TODAY'S VISIT:
Pt recently underwent colonoscopy for severe constipation in 8/2021. 2 small hyperplastic polyps were removed. Hemorroids were seen. Pt continues to have severe gas and bloating. Constipation well controlled with linzess 72 mcg/day.